# Patient Record
Sex: FEMALE | Race: WHITE | NOT HISPANIC OR LATINO | Employment: UNEMPLOYED | URBAN - METROPOLITAN AREA
[De-identification: names, ages, dates, MRNs, and addresses within clinical notes are randomized per-mention and may not be internally consistent; named-entity substitution may affect disease eponyms.]

---

## 2021-12-02 ENCOUNTER — OFFICE VISIT (OUTPATIENT)
Dept: OTOLARYNGOLOGY | Facility: CLINIC | Age: 61
End: 2021-12-02

## 2021-12-02 VITALS — WEIGHT: 150.2 LBS | TEMPERATURE: 97.3 F | BODY MASS INDEX: 27.64 KG/M2 | HEIGHT: 62 IN

## 2021-12-02 DIAGNOSIS — J34.89 NASAL CRUSTING: Primary | ICD-10-CM

## 2021-12-02 DIAGNOSIS — H60.541 DERMATITIS OF RIGHT EAR CANAL: ICD-10-CM

## 2021-12-02 PROCEDURE — 99203 OFFICE O/P NEW LOW 30 MIN: CPT | Performed by: NURSE PRACTITIONER

## 2021-12-02 RX ORDER — FLUOXETINE HYDROCHLORIDE 20 MG/1
20 CAPSULE ORAL
COMMUNITY
Start: 2021-08-31

## 2021-12-02 RX ORDER — ZOLPIDEM TARTRATE 10 MG/1
10 TABLET ORAL
COMMUNITY
Start: 2021-11-23

## 2021-12-02 RX ORDER — DEXAMETHASONE 2 MG/1
TABLET ORAL
COMMUNITY
Start: 2021-09-15

## 2021-12-02 RX ORDER — GABAPENTIN 300 MG/1
600 CAPSULE ORAL 3 TIMES DAILY
COMMUNITY
Start: 2021-11-23

## 2021-12-02 NOTE — PROGRESS NOTES
Patient Name: Alexandria Lara   Visit Date: 12/02/2021   Patient ID: [id number]  Provider: MARIA ESTHER Vines    Sex: female  Location: [facility]   YOB: 1960  Location Address: [address] [place] [address], [address],?[place]?[id number]    Primary Care Provider Richard Garcia MD  Location Phone: [phone]    Referring Provider: Richard Garcia MD        Chief Complaint  Other (ear sore)    Subjective   Alexandria Lara is a 61 y.o. female who presents to [facility] today as a consult from Richard Garcia MD for evaluation of her ear and nose. She states for several months in her right ear she has had a sore spot. She states it feels crusting and sometimes drains a clear yellow fluid. She has used A&E ointment and alcohol on the area with no change. She states that it is sore when she lays on that side. She also reports from time to time she gets crusts in her nose. She states sometimes she will have a small amount of bleeding from the crusts and will be sore. She does not have any nasal crusting at this time.      Current Outpatient Medications on File Prior to Visit   Medication Sig   • FLUoxetine (PROzac) 20 MG capsule Take 20 mg by mouth every night at bedtime.   • gabapentin (NEURONTIN) 300 MG capsule Take 600 mg by mouth 3 (Three) Times a Day.   • zolpidem (AMBIEN) 10 MG tablet Take 10 mg by mouth every night at bedtime.   • dexamethasone (DECADRON) 2 MG tablet TAKE ONE TABLET BY MOUTH TWO TIMES A DAY WITH FOOD     No current facility-administered medications on file prior to visit.        Social History     Tobacco Use   • Smoking status: Current Every Day Smoker     Packs/day: 1.00     Types: Cigarettes   • Smokeless tobacco: Never Used   Vaping Use   • Vaping Use: Never used   Substance Use Topics   • Alcohol use: Not on file   • Drug use: Not on file       Objective     Vital Signs:   Temp 97.3 °F (36.3 °C) (Temporal)   Ht 157.5 cm  "(62\")   Wt 68.1 kg (150 lb 3.2 oz)   BMI 27.47 kg/m²       Physical Exam  Constitutional:       General: She is not in acute distress.     Appearance: Normal appearance. She is not ill-appearing.   HENT:      Head: Normocephalic and atraumatic.      Jaw: There is normal jaw occlusion. No tenderness or pain on movement.      Salivary Glands: Right salivary gland is not diffusely enlarged or tender. Left salivary gland is not diffusely enlarged or tender.      Right Ear: Tympanic membrane and external ear normal.      Left Ear: Tympanic membrane, ear canal and external ear normal.      Ears:        Comments: At the opening of the right ear there is an area of irritated skin that is cracked and oozing yellow drainage     Nose: Nose normal. No septal deviation.      Right Sinus: No maxillary sinus tenderness or frontal sinus tenderness.      Left Sinus: No maxillary sinus tenderness or frontal sinus tenderness.      Mouth/Throat:      Lips: Pink. No lesions.      Mouth: Mucous membranes are moist. No oral lesions.      Dentition: Normal dentition.      Tongue: No lesions.      Palate: No mass and lesions.      Pharynx: Oropharynx is clear. Uvula midline.      Tonsils: No tonsillar exudate.   Eyes:      Extraocular Movements: Extraocular movements intact.      Conjunctiva/sclera: Conjunctivae normal.      Pupils: Pupils are equal, round, and reactive to light.   Neck:      Thyroid: No thyroid mass, thyromegaly or thyroid tenderness.      Trachea: Trachea normal.   Pulmonary:      Effort: Pulmonary effort is normal. No respiratory distress.   Musculoskeletal:         General: Normal range of motion.      Cervical back: Normal range of motion and neck supple. No tenderness.   Lymphadenopathy:      Cervical: No cervical adenopathy.   Skin:     General: Skin is warm and dry.   Neurological:      General: No focal deficit present.      Mental Status: She is alert and oriented to person, place, and time.      Cranial Nerves: " No cranial nerve deficit.      Motor: No weakness.      Gait: Gait normal.   Psychiatric:         Mood and Affect: Mood normal.         Behavior: Behavior normal.         Thought Content: Thought content normal.         Judgment: Judgment normal.               Result Review :              Assessment and Plan    Diagnoses and all orders for this visit:    1. Nasal crusting (Primary)  -     mupirocin (BACTROBAN) 2 % ointment; Apply 1 application topically to the appropriate area as directed 2 (Two) Times a Day for 7 days.  Dispense: 22 g; Refill: 0    2. Dermatitis of right ear canal  -     mupirocin (BACTROBAN) 2 % ointment; Apply 1 application topically to the appropriate area as directed 2 (Two) Times a Day for 7 days.  Dispense: 22 g; Refill: 0    Have advised her to not use any alcohol in the area in her ear as it is already cracked and irritated looking. I will have her use mupirocin ointment on this area as it is oozing a yellow drainage. I advised her that the mupirocin ointment can also be used in the nose should she have any further issues with crusting. Patient denies any other areas on her skin of concern. I would like to see her back in 1 month for follow-up.       Follow Up   Return in about 1 month (around 1/2/2022).  Patient was given instructions and counseling regarding her condition or for health maintenance advice. Please see specific information pulled into the AVS if appropriate.      MARIA ESTHER Vines